# Patient Record
Sex: MALE | Race: WHITE | NOT HISPANIC OR LATINO | Employment: FULL TIME | URBAN - METROPOLITAN AREA
[De-identification: names, ages, dates, MRNs, and addresses within clinical notes are randomized per-mention and may not be internally consistent; named-entity substitution may affect disease eponyms.]

---

## 2023-11-03 PROBLEM — I34.0 MITRAL REGURGITATION: Status: ACTIVE | Noted: 2023-11-03

## 2023-11-03 PROBLEM — M54.2 NECK PAIN: Status: ACTIVE | Noted: 2023-11-03

## 2023-11-03 PROBLEM — R07.89 CHEST PAIN, MIDSTERNAL: Status: ACTIVE | Noted: 2023-11-03

## 2023-11-03 PROBLEM — M25.541 ARTHRALGIA OF BOTH HANDS: Status: ACTIVE | Noted: 2023-11-03

## 2023-11-03 PROBLEM — I34.1 MITRAL VALVE PROLAPSE: Status: ACTIVE | Noted: 2023-11-03

## 2023-11-03 PROBLEM — I25.10 CORONARY ARTERY DISEASE: Status: ACTIVE | Noted: 2023-11-03

## 2023-11-03 PROBLEM — G54.2 CERVICAL NERVE ROOT IMPINGEMENT: Status: ACTIVE | Noted: 2023-11-03

## 2023-11-03 PROBLEM — L98.8 SKIN MACULE: Status: ACTIVE | Noted: 2023-11-03

## 2023-11-03 PROBLEM — I10 HTN (HYPERTENSION): Status: ACTIVE | Noted: 2023-11-03

## 2023-11-03 PROBLEM — E78.5 HYPERLIPIDEMIA: Status: ACTIVE | Noted: 2023-11-03

## 2023-11-03 PROBLEM — M25.542 ARTHRALGIA OF BOTH HANDS: Status: ACTIVE | Noted: 2023-11-03

## 2023-11-03 PROBLEM — R01.1 HEART MURMUR: Status: ACTIVE | Noted: 2023-11-03

## 2023-11-03 PROBLEM — R06.89 ABNORMAL BREATH SOUNDS: Status: ACTIVE | Noted: 2023-11-03

## 2023-11-03 RX ORDER — FLUTICASONE PROPIONATE 50 MCG
SPRAY, SUSPENSION (ML) NASAL
COMMUNITY
Start: 2023-01-09 | End: 2024-02-13 | Stop reason: WASHOUT

## 2023-11-03 RX ORDER — ATORVASTATIN CALCIUM 10 MG/1
1 TABLET, FILM COATED ORAL DAILY
COMMUNITY
Start: 2021-03-18 | End: 2024-02-13 | Stop reason: SDUPTHER

## 2023-11-03 RX ORDER — LOSARTAN POTASSIUM 50 MG/1
1 TABLET ORAL DAILY
COMMUNITY
Start: 2022-03-17 | End: 2024-02-13 | Stop reason: SDUPTHER

## 2023-11-06 ENCOUNTER — OFFICE VISIT (OUTPATIENT)
Dept: PRIMARY CARE | Facility: CLINIC | Age: 53
End: 2023-11-06
Payer: COMMERCIAL

## 2023-11-06 VITALS
TEMPERATURE: 97.7 F | SYSTOLIC BLOOD PRESSURE: 133 MMHG | OXYGEN SATURATION: 98 % | DIASTOLIC BLOOD PRESSURE: 78 MMHG | BODY MASS INDEX: 27.33 KG/M2 | HEART RATE: 68 BPM | HEIGHT: 72 IN | WEIGHT: 201.8 LBS

## 2023-11-06 DIAGNOSIS — G89.29 CHRONIC PAIN OF LEFT WRIST: ICD-10-CM

## 2023-11-06 DIAGNOSIS — E78.5 HYPERLIPIDEMIA, UNSPECIFIED HYPERLIPIDEMIA TYPE: ICD-10-CM

## 2023-11-06 DIAGNOSIS — M25.532 CHRONIC PAIN OF LEFT WRIST: ICD-10-CM

## 2023-11-06 DIAGNOSIS — I10 BENIGN ESSENTIAL HYPERTENSION: ICD-10-CM

## 2023-11-06 DIAGNOSIS — Z00.00 ROUTINE GENERAL MEDICAL EXAMINATION AT A HEALTH CARE FACILITY: Primary | ICD-10-CM

## 2023-11-06 PROCEDURE — 1036F TOBACCO NON-USER: CPT | Performed by: INTERNAL MEDICINE

## 2023-11-06 PROCEDURE — 99396 PREV VISIT EST AGE 40-64: CPT | Performed by: INTERNAL MEDICINE

## 2023-11-06 PROCEDURE — 3078F DIAST BP <80 MM HG: CPT | Performed by: INTERNAL MEDICINE

## 2023-11-06 PROCEDURE — 3075F SYST BP GE 130 - 139MM HG: CPT | Performed by: INTERNAL MEDICINE

## 2023-11-06 ASSESSMENT — PATIENT HEALTH QUESTIONNAIRE - PHQ9
SUM OF ALL RESPONSES TO PHQ9 QUESTIONS 1 AND 2: 0
2. FEELING DOWN, DEPRESSED OR HOPELESS: NOT AT ALL
1. LITTLE INTEREST OR PLEASURE IN DOING THINGS: NOT AT ALL

## 2023-11-06 ASSESSMENT — PAIN SCALES - GENERAL: PAINLEVEL: 0-NO PAIN

## 2023-11-06 NOTE — PROGRESS NOTES
Subjective   Patient ID: Jacobo Nolen is a 53 y.o. male who presents for Annual Exam (Left wrist pain during the summer  ).    HPI     History reviewed and updated.  Will return for fasting labs.  Over summer, developed left wrist pain.  He did not have any acute injury such as a fall.  Pain went across top of hand diagonally and into wrist.  Sometimes with some tenderness of the anterior wrist.  Minimal numbness / tingling.  Has tried using a brace - helps somewhat.  Overall it is better than it was a few months ago, but still has to be careful to avoid triggering movements or pressure.    Review of Systems   Constitutional:  Negative for chills, fatigue and fever.   Respiratory:  Negative for cough, shortness of breath and wheezing.    Cardiovascular:  Negative for chest pain, palpitations and leg swelling.   Gastrointestinal:  Negative for abdominal pain, constipation, diarrhea and nausea.   Genitourinary:  Negative for dysuria and hematuria.   Musculoskeletal:  Positive for arthralgias. Negative for back pain and gait problem.   Neurological:  Negative for dizziness, light-headedness and numbness.   Psychiatric/Behavioral:  Negative for confusion.        Objective   /78   Pulse 68   Temp 36.5 °C (97.7 °F) (Temporal)   Ht 1.829 m (6')   Wt 91.5 kg (201 lb 12.8 oz)   SpO2 98%   BMI 27.37 kg/m²     Physical Exam  Constitutional:       Appearance: Normal appearance.   HENT:      Head: Normocephalic and atraumatic.      Right Ear: Tympanic membrane and ear canal normal.      Left Ear: Tympanic membrane and ear canal normal.   Neck:      Vascular: No carotid bruit.   Cardiovascular:      Rate and Rhythm: Normal rate and regular rhythm.      Pulses: Normal pulses.      Heart sounds: No murmur heard.  Pulmonary:      Effort: Pulmonary effort is normal. No respiratory distress.      Breath sounds: Normal breath sounds. No wheezing.   Abdominal:      General: There is no distension.      Palpations: Abdomen  is soft.      Tenderness: There is no abdominal tenderness.   Musculoskeletal:      Right lower leg: No edema.      Left lower leg: No edema.      Comments: No swelling or deformity of left hand / wrist.  No erythema.  No current point tenderness.   Skin:     Findings: No rash.   Neurological:      General: No focal deficit present.      Mental Status: He is alert and oriented to person, place, and time. Mental status is at baseline.   Psychiatric:         Mood and Affect: Mood normal.         Behavior: Behavior normal.         Thought Content: Thought content normal.         Judgment: Judgment normal.         Assessment/Plan   Problem List Items Addressed This Visit             ICD-10-CM    Benign essential hypertension I10    Hyperlipidemia E78.5    Chronic pain of left wrist M25.532, G89.29     Other Visit Diagnoses         Codes    Routine general medical examination at a health care facility    -  Primary Z00.00    Relevant Orders    CBC and Auto Differential    Comprehensive Metabolic Panel    Lipid Panel    Prostate Specific Antigen          Well Visit    Hypertension - BP is at goal.  Continue current medication and heart healthy habits.    Mixed hyperlipidemia - continue statin.    Return for routine fasting labs.    Left wrist pain - chronic, improved over several months ago.  Location of pain is not strictly anatomical, as it started diagonally over the dorsal hand, wrapping around to the anterior wrist.  There was no reported injury other than repetitive motions at work.  Given that has improved somewhat, I recommend continued rest as able, bracing when needed, and Voltaren gel.  No indication for imaging at this time.  Can follow-up if symptoms worsen.    Prostate cancer screening - PSA ordered.    Colonoscopy 3/8/2021, repeat 3 years.  Dr. James.    Reviewed signs of skin of skin cancer and importance of sun protection.    Continue to stay up to date with dental and eye exams.    Flu shot completed  previously.  Shingrix is recommended.    Follow-up in 6 months and PRN.

## 2023-11-09 PROBLEM — M25.532 CHRONIC PAIN OF LEFT WRIST: Status: ACTIVE | Noted: 2023-11-09

## 2023-11-09 PROBLEM — G89.29 CHRONIC PAIN OF LEFT WRIST: Status: ACTIVE | Noted: 2023-11-09

## 2023-11-09 ASSESSMENT — ENCOUNTER SYMPTOMS
DIARRHEA: 0
HEMATURIA: 0
WHEEZING: 0
FEVER: 0
SHORTNESS OF BREATH: 0
NAUSEA: 0
FATIGUE: 0
ARTHRALGIAS: 1
CONFUSION: 0
PALPITATIONS: 0
ABDOMINAL PAIN: 0
CHILLS: 0
CONSTIPATION: 0
COUGH: 0
LIGHT-HEADEDNESS: 0
BACK PAIN: 0
DIZZINESS: 0
NUMBNESS: 0
DYSURIA: 0

## 2023-11-18 LAB
NON-UH HIE A/G RATIO: 1.3
NON-UH HIE ALB: 3.7 G/DL (ref 3.4–5)
NON-UH HIE ALK PHOS: 76 UNIT/L (ref 45–117)
NON-UH HIE BASO COUNT: 0.08 X1000 (ref 0–0.2)
NON-UH HIE BASOS %: 1.4 %
NON-UH HIE BILIRUBIN, TOTAL: 1.8 MG/DL (ref 0.3–1.2)
NON-UH HIE BUN/CREAT RATIO: 17
NON-UH HIE BUN: 17 MG/DL (ref 9–23)
NON-UH HIE CALCIUM: 9.7 MG/DL (ref 8.7–10.4)
NON-UH HIE CALCULATED LDL CHOLESTEROL: 65 MG/DL (ref 60–130)
NON-UH HIE CALCULATED OSMOLALITY: 289 MOSM/KG (ref 275–295)
NON-UH HIE CHLORIDE: 108 MMOL/L (ref 98–107)
NON-UH HIE CHOLESTEROL: 130 MG/DL (ref 100–200)
NON-UH HIE CO2, VENOUS: 30 MMOL/L (ref 20–31)
NON-UH HIE CREATININE: 1 MG/DL (ref 0.6–1.1)
NON-UH HIE DIFF?: NO
NON-UH HIE EOS COUNT: 0.23 X1000 (ref 0–0.5)
NON-UH HIE EOSIN %: 3.8 %
NON-UH HIE GFR AA: >60
NON-UH HIE GLOBULIN: 2.8 G/DL
NON-UH HIE GLOMERULAR FILTRATION RATE: >60 ML/MIN/1.73M?
NON-UH HIE GLUCOSE: 110 MG/DL (ref 74–106)
NON-UH HIE GOT: 23 UNIT/L (ref 15–37)
NON-UH HIE GPT: 39 UNIT/L (ref 10–49)
NON-UH HIE HCT: 43.4 % (ref 41–52)
NON-UH HIE HDL CHOLESTEROL: 49 MG/DL (ref 40–60)
NON-UH HIE HGB: 14.8 G/DL (ref 13.5–17.5)
NON-UH HIE INSTR WBC: 6.1
NON-UH HIE K: 4.3 MMOL/L (ref 3.5–5.1)
NON-UH HIE LYMPH %: 30.9 %
NON-UH HIE LYMPH COUNT: 1.88 X1000 (ref 1.2–4.8)
NON-UH HIE MCH: 33.2 PG (ref 27–34)
NON-UH HIE MCHC: 34.2 G/DL (ref 32–37)
NON-UH HIE MCV: 97.2 FL (ref 80–100)
NON-UH HIE MONO %: 8.2 %
NON-UH HIE MONO COUNT: 0.5 X1000 (ref 0.1–1)
NON-UH HIE MPV: 8.5 FL (ref 7.4–10.4)
NON-UH HIE NA: 144 MMOL/L (ref 135–145)
NON-UH HIE NEUTROPHIL %: 55.6 %
NON-UH HIE NEUTROPHIL COUNT (ANC): 3.38 X1000 (ref 1.4–8.8)
NON-UH HIE NUCLEATED RBC: 0 /100WBC
NON-UH HIE PLATELET: 239 X10 (ref 150–450)
NON-UH HIE PROSTATIC SPECIFIC ANTIGEN: 0.4 NG/ML (ref 0–4)
NON-UH HIE RBC: 4.47 X10 (ref 4.7–6.1)
NON-UH HIE RDW: 12.9 % (ref 11.5–14.5)
NON-UH HIE TOTAL CHOL/HDL CHOL RATIO: 2.7
NON-UH HIE TOTAL PROTEIN: 6.5 G/DL (ref 5.7–8.2)
NON-UH HIE TRIGLYCERIDES: 81 MG/DL (ref 30–150)
NON-UH HIE WBC: 6.1 X10 (ref 4.5–11)

## 2023-11-22 PROBLEM — R73.01 IMPAIRED FASTING BLOOD SUGAR: Status: ACTIVE | Noted: 2023-11-22

## 2024-02-12 ENCOUNTER — AMBULATORY SURGICAL CENTER (OUTPATIENT)
Dept: URBAN - METROPOLITAN AREA SURGERY 12 | Facility: SURGERY | Age: 54
End: 2024-02-12
Payer: COMMERCIAL

## 2024-02-12 ENCOUNTER — OFFICE (OUTPATIENT)
Dept: URBAN - METROPOLITAN AREA PATHOLOGY 2 | Facility: PATHOLOGY | Age: 54
End: 2024-02-12
Payer: COMMERCIAL

## 2024-02-12 VITALS
HEART RATE: 60 BPM | HEART RATE: 77 BPM | SYSTOLIC BLOOD PRESSURE: 103 MMHG | OXYGEN SATURATION: 98 % | SYSTOLIC BLOOD PRESSURE: 98 MMHG | DIASTOLIC BLOOD PRESSURE: 55 MMHG | HEART RATE: 60 BPM | SYSTOLIC BLOOD PRESSURE: 140 MMHG | RESPIRATION RATE: 20 BRPM | SYSTOLIC BLOOD PRESSURE: 113 MMHG | SYSTOLIC BLOOD PRESSURE: 113 MMHG | HEART RATE: 77 BPM | RESPIRATION RATE: 12 BRPM | HEART RATE: 73 BPM | HEART RATE: 63 BPM | RESPIRATION RATE: 21 BRPM | WEIGHT: 200 LBS | SYSTOLIC BLOOD PRESSURE: 102 MMHG | SYSTOLIC BLOOD PRESSURE: 117 MMHG | SYSTOLIC BLOOD PRESSURE: 102 MMHG | HEART RATE: 67 BPM | HEART RATE: 63 BPM | SYSTOLIC BLOOD PRESSURE: 98 MMHG | HEART RATE: 71 BPM | DIASTOLIC BLOOD PRESSURE: 51 MMHG | HEART RATE: 55 BPM | HEIGHT: 72 IN | HEART RATE: 72 BPM | OXYGEN SATURATION: 99 % | WEIGHT: 200 LBS | SYSTOLIC BLOOD PRESSURE: 130 MMHG | OXYGEN SATURATION: 96 % | RESPIRATION RATE: 23 BRPM | HEART RATE: 65 BPM | HEART RATE: 67 BPM | RESPIRATION RATE: 22 BRPM | HEART RATE: 72 BPM | SYSTOLIC BLOOD PRESSURE: 140 MMHG | HEART RATE: 73 BPM | DIASTOLIC BLOOD PRESSURE: 81 MMHG | HEART RATE: 63 BPM | DIASTOLIC BLOOD PRESSURE: 87 MMHG | OXYGEN SATURATION: 96 % | HEIGHT: 72 IN | RESPIRATION RATE: 12 BRPM | RESPIRATION RATE: 18 BRPM | RESPIRATION RATE: 18 BRPM | TEMPERATURE: 97.7 F | DIASTOLIC BLOOD PRESSURE: 53 MMHG | RESPIRATION RATE: 16 BRPM | SYSTOLIC BLOOD PRESSURE: 98 MMHG | RESPIRATION RATE: 20 BRPM | RESPIRATION RATE: 22 BRPM | HEART RATE: 55 BPM | OXYGEN SATURATION: 99 % | DIASTOLIC BLOOD PRESSURE: 61 MMHG | TEMPERATURE: 97.7 F | SYSTOLIC BLOOD PRESSURE: 140 MMHG | HEART RATE: 72 BPM | RESPIRATION RATE: 23 BRPM | SYSTOLIC BLOOD PRESSURE: 117 MMHG | DIASTOLIC BLOOD PRESSURE: 61 MMHG | SYSTOLIC BLOOD PRESSURE: 130 MMHG | HEART RATE: 60 BPM | HEIGHT: 72 IN | RESPIRATION RATE: 12 BRPM | HEART RATE: 71 BPM | RESPIRATION RATE: 22 BRPM | DIASTOLIC BLOOD PRESSURE: 59 MMHG | HEART RATE: 65 BPM | DIASTOLIC BLOOD PRESSURE: 59 MMHG | TEMPERATURE: 97.7 F | HEART RATE: 77 BPM | OXYGEN SATURATION: 99 % | RESPIRATION RATE: 18 BRPM | SYSTOLIC BLOOD PRESSURE: 103 MMHG | DIASTOLIC BLOOD PRESSURE: 55 MMHG | DIASTOLIC BLOOD PRESSURE: 53 MMHG | OXYGEN SATURATION: 98 % | DIASTOLIC BLOOD PRESSURE: 81 MMHG | SYSTOLIC BLOOD PRESSURE: 95 MMHG | HEART RATE: 73 BPM | RESPIRATION RATE: 13 BRPM | DIASTOLIC BLOOD PRESSURE: 87 MMHG | OXYGEN SATURATION: 94 % | SYSTOLIC BLOOD PRESSURE: 102 MMHG | OXYGEN SATURATION: 94 % | HEART RATE: 55 BPM | DIASTOLIC BLOOD PRESSURE: 51 MMHG | SYSTOLIC BLOOD PRESSURE: 90 MMHG | RESPIRATION RATE: 16 BRPM | RESPIRATION RATE: 21 BRPM | RESPIRATION RATE: 16 BRPM | SYSTOLIC BLOOD PRESSURE: 117 MMHG | RESPIRATION RATE: 23 BRPM | WEIGHT: 200 LBS | OXYGEN SATURATION: 96 % | DIASTOLIC BLOOD PRESSURE: 55 MMHG | SYSTOLIC BLOOD PRESSURE: 90 MMHG | DIASTOLIC BLOOD PRESSURE: 61 MMHG | SYSTOLIC BLOOD PRESSURE: 113 MMHG | SYSTOLIC BLOOD PRESSURE: 130 MMHG | OXYGEN SATURATION: 98 % | RESPIRATION RATE: 21 BRPM | DIASTOLIC BLOOD PRESSURE: 59 MMHG | RESPIRATION RATE: 13 BRPM | HEART RATE: 67 BPM | RESPIRATION RATE: 20 BRPM | HEART RATE: 71 BPM | SYSTOLIC BLOOD PRESSURE: 95 MMHG | DIASTOLIC BLOOD PRESSURE: 81 MMHG | RESPIRATION RATE: 13 BRPM | OXYGEN SATURATION: 94 % | SYSTOLIC BLOOD PRESSURE: 95 MMHG | SYSTOLIC BLOOD PRESSURE: 103 MMHG | DIASTOLIC BLOOD PRESSURE: 87 MMHG | SYSTOLIC BLOOD PRESSURE: 90 MMHG | DIASTOLIC BLOOD PRESSURE: 51 MMHG | HEART RATE: 65 BPM | DIASTOLIC BLOOD PRESSURE: 53 MMHG

## 2024-02-12 DIAGNOSIS — K63.5 POLYP OF COLON: ICD-10-CM

## 2024-02-12 DIAGNOSIS — K64.8 OTHER HEMORRHOIDS: ICD-10-CM

## 2024-02-12 DIAGNOSIS — Z86.010 PERSONAL HISTORY OF COLONIC POLYPS: ICD-10-CM

## 2024-02-12 DIAGNOSIS — Z09 ENCOUNTER FOR FOLLOW-UP EXAMINATION AFTER COMPLETED TREATMEN: ICD-10-CM

## 2024-02-12 PROCEDURE — 45384 COLONOSCOPY W/LESION REMOVAL: CPT | Performed by: INTERNAL MEDICINE

## 2024-02-12 PROCEDURE — 88305 TISSUE EXAM BY PATHOLOGIST: CPT | Performed by: PATHOLOGY

## 2024-02-13 ENCOUNTER — OFFICE VISIT (OUTPATIENT)
Dept: CARDIOLOGY | Facility: CLINIC | Age: 54
End: 2024-02-13
Payer: COMMERCIAL

## 2024-02-13 VITALS
BODY MASS INDEX: 27.09 KG/M2 | DIASTOLIC BLOOD PRESSURE: 76 MMHG | OXYGEN SATURATION: 95 % | HEART RATE: 84 BPM | HEIGHT: 72 IN | SYSTOLIC BLOOD PRESSURE: 130 MMHG | WEIGHT: 200 LBS

## 2024-02-13 DIAGNOSIS — I10 BENIGN ESSENTIAL HYPERTENSION: Primary | ICD-10-CM

## 2024-02-13 DIAGNOSIS — E78.49 OTHER HYPERLIPIDEMIA: ICD-10-CM

## 2024-02-13 DIAGNOSIS — I34.0 NONRHEUMATIC MITRAL VALVE REGURGITATION: ICD-10-CM

## 2024-02-13 DIAGNOSIS — I25.10 CORONARY ARTERY DISEASE INVOLVING NATIVE CORONARY ARTERY OF NATIVE HEART WITHOUT ANGINA PECTORIS: ICD-10-CM

## 2024-02-13 DIAGNOSIS — I34.1 MITRAL VALVE PROLAPSE: ICD-10-CM

## 2024-02-13 PROBLEM — R07.89 CHEST PAIN, MIDSTERNAL: Status: RESOLVED | Noted: 2023-11-03 | Resolved: 2024-02-13

## 2024-02-13 PROBLEM — R01.1 HEART MURMUR: Status: RESOLVED | Noted: 2023-11-03 | Resolved: 2024-02-13

## 2024-02-13 PROCEDURE — 3078F DIAST BP <80 MM HG: CPT | Performed by: INTERNAL MEDICINE

## 2024-02-13 PROCEDURE — 1036F TOBACCO NON-USER: CPT | Performed by: INTERNAL MEDICINE

## 2024-02-13 PROCEDURE — 3075F SYST BP GE 130 - 139MM HG: CPT | Performed by: INTERNAL MEDICINE

## 2024-02-13 PROCEDURE — 99213 OFFICE O/P EST LOW 20 MIN: CPT | Performed by: INTERNAL MEDICINE

## 2024-02-13 RX ORDER — LOSARTAN POTASSIUM 50 MG/1
50 TABLET ORAL DAILY
Qty: 90 TABLET | Refills: 3 | Status: SHIPPED | OUTPATIENT
Start: 2024-02-13 | End: 2025-02-12

## 2024-02-13 RX ORDER — ATORVASTATIN CALCIUM 10 MG/1
10 TABLET, FILM COATED ORAL DAILY
Qty: 90 TABLET | Refills: 3 | Status: SHIPPED | OUTPATIENT
Start: 2024-02-13 | End: 2025-02-12

## 2024-02-13 NOTE — PATIENT INSTRUCTIONS
No changes in your medications.    See an ENT physician Dr. Mitchell Myers [phone] at [facility] or Dr. Julien Cordova [phone] at [facility].

## 2024-02-13 NOTE — PROGRESS NOTES
Subjective   Dakota Nolen is a 53 y.o. male.    Chief Complaint:  Follow-up coronary artery disease.    HPI    From a cardiac standpoint he has done well.  He has remained asymptomatic over the past year.  No other symptoms such as shortness of breath or exertional dyspnea.  He does complain of dysphagia.  It is in the throat area.  He has had it for the past month.    The diagnosis of coronary artery disease is based on a positive calcium score 53 consistent with the presence of mild coronary atherosclerosis. This study was done in February 2021.     His past cardiac history is otherwise unremarkable. There is no history hypertension. There is no history of diabetes. There is a positive family history of heart disease in that his father had coronary artery bypass grafting in his mid 70s. At the time he also had an aortic valve done. There is a history of hyperlipidemia.     He's had no major surgical procedures or major medical problems except for arthritis issues. He does take glucosamine chondroitin.     Allergies  Medication    · No Known Drug Allergies   Recorded By: Christiane Choudhary; 3/5/2014 4:24:46 PM     Family History  Mother    · Family history of diabetes mellitus (V18.0) (Z83.3)   · Family history of hypertension (V17.49) (Z82.49)   · Family history of macular degeneration (V19.19) (Z83.518)   · Family history of polymyalgia rheumatica (V17.89) (Z82.69)  Father    · Family history of CABG   · Family history of cerebrovascular accident (CVA) (V17.1) (Z82.3)   · Family history of coronary artery disease (V17.3) (Z82.49)   · Family history of diabetes mellitus (V18.0) (Z83.3)   · Family history of glaucoma (V19.11) (Z83.511)   · Family history of hypercholesterolemia (V18.19) (Z83.42)   · Family history of hypertension (V17.49) (Z82.49)     Social History  Problems    ·    · Does not use illicit drugs (V49.89) (Z78.9)   · Never a smoker   · Occupation   ·    · Social alcohol  use    Review of Systems   Gastrointestinal:  Positive for dysphagia.   All other systems reviewed and are negative.      Visit Vitals  /76 (BP Location: Left arm, Patient Position: Sitting)   Pulse 84   Ht 1.829 m (6')   Wt 90.7 kg (200 lb)   SpO2 95%   BMI 27.12 kg/m²   Smoking Status Former   BSA 2.15 m²        Objective     Constitutional:       Appearance: Not in distress.   Neck:      Vascular: JVD normal.   Pulmonary:      Breath sounds: Normal breath sounds.   Cardiovascular:      Normal rate. Regular rhythm. S1 with normal intensity. S2 with normal intensity.       Murmurs: There is no murmur.      No gallop.    Pulses:     Intact distal pulses.   Edema:     Peripheral edema absent.   Abdominal:      General: Bowel sounds are normal.   Neurological:      Mental Status: Alert and oriented to person, place and time.         Lab Review:   Lab Results   Component Value Date     02/01/2018    K 3.9 02/01/2018     02/01/2018    CO2 32 02/01/2018    BUN 14 02/01/2018    CREATININE 0.83 02/01/2018    GLUCOSE 91 02/01/2018    CALCIUM 10.1 02/01/2018       Assessment:    1.  Coronary disease.  Remains asymptomatic.  Mild by CT calcium scoring but this is a very high score for his age.    2.  Hyperlipidemia.  Cholesterol 130, HDL 49, LDL 65.    3.  Hypertension.  Blood pressures are normal.    4.  Dysphagia.  Refer to ENT since his dysphagia appears to be in the throat area.

## 2024-11-11 ENCOUNTER — APPOINTMENT (OUTPATIENT)
Dept: PRIMARY CARE | Facility: CLINIC | Age: 54
End: 2024-11-11
Payer: COMMERCIAL

## 2024-11-11 VITALS
OXYGEN SATURATION: 96 % | WEIGHT: 199.9 LBS | DIASTOLIC BLOOD PRESSURE: 81 MMHG | BODY MASS INDEX: 27.07 KG/M2 | SYSTOLIC BLOOD PRESSURE: 118 MMHG | TEMPERATURE: 97.2 F | HEART RATE: 77 BPM | HEIGHT: 72 IN

## 2024-11-11 DIAGNOSIS — I10 BENIGN ESSENTIAL HYPERTENSION: ICD-10-CM

## 2024-11-11 DIAGNOSIS — E78.49 OTHER HYPERLIPIDEMIA: ICD-10-CM

## 2024-11-11 DIAGNOSIS — Z00.00 HEALTHCARE MAINTENANCE: Primary | ICD-10-CM

## 2024-11-11 PROCEDURE — 1036F TOBACCO NON-USER: CPT | Performed by: INTERNAL MEDICINE

## 2024-11-11 PROCEDURE — 3074F SYST BP LT 130 MM HG: CPT | Performed by: INTERNAL MEDICINE

## 2024-11-11 PROCEDURE — 99396 PREV VISIT EST AGE 40-64: CPT | Performed by: INTERNAL MEDICINE

## 2024-11-11 PROCEDURE — 3008F BODY MASS INDEX DOCD: CPT | Performed by: INTERNAL MEDICINE

## 2024-11-11 PROCEDURE — 3079F DIAST BP 80-89 MM HG: CPT | Performed by: INTERNAL MEDICINE

## 2024-11-11 ASSESSMENT — PATIENT HEALTH QUESTIONNAIRE - PHQ9
1. LITTLE INTEREST OR PLEASURE IN DOING THINGS: NOT AT ALL
2. FEELING DOWN, DEPRESSED OR HOPELESS: NOT AT ALL
SUM OF ALL RESPONSES TO PHQ9 QUESTIONS 1 AND 2: 0

## 2024-11-11 NOTE — PROGRESS NOTES
CHIEF COMPLAINT  Annual Exam    HISTORY OF PRESENT ILLNESS  Dakota Nolen is a 54 y.o. male presents today for follow up of Annual Exam    HPI    Past Medical, Surgical, and Family History reviewed and updated in chart.  Reviewed all medications by prescribing practitioner or clinical pharmacist (such as prescriptions, OTCs, herbal therapies and supplements) and documented in the medical record.    Stable on current  medications.  Due for fasting labs.   Plans to see dermatologist for spot on face and scalp.      REVIEW OF SYSTEMS  Review of Systems   Constitutional:  Negative for chills, fatigue and fever.   Respiratory:  Negative for cough, shortness of breath and wheezing.    Cardiovascular:  Negative for chest pain, palpitations and leg swelling.   Gastrointestinal:  Negative for abdominal pain, constipation, diarrhea and nausea.   Genitourinary:  Negative for dysuria and hematuria.   Musculoskeletal:  Negative for arthralgias, back pain and gait problem.   Skin:  Negative for rash.   Neurological:  Negative for dizziness, light-headedness and headaches.   Psychiatric/Behavioral:  Negative for dysphoric mood. The patient is not nervous/anxious.        ALLERGIES  Patient has no known allergies.    MEDICATIONS  Current Outpatient Medications   Medication Instructions    atorvastatin (LIPITOR) 10 mg, oral, Daily    ergocalciferol, vitamin D2, (VITAMIN D2 ORAL) Take by mouth.    glucosamine sulfate (GLUCOSAMINE ORAL) Take by mouth.    losartan (COZAAR) 50 mg, oral, Daily       TOBACCO USE  Social History     Tobacco Use   Smoking Status Former    Types: Cigarettes   Smokeless Tobacco Never       DEPRESSION SCREEN  Over the past 2 weeks, how often have you been bothered by any of the following problems?  Little interest or pleasure in doing things: Not at all  Feeling down, depressed, or hopeless: Not at all    SURGICAL HISTORY  Past Surgical History:  03/05/2018: COLONOSCOPY      Comment:  Complete  Colonoscopy  03/08/2021: OTHER SURGICAL HISTORY      Comment:  Complete colonoscopy       OBJECTIVE    /81   Pulse 77   Temp 36.2 °C (97.2 °F)   Ht 1.829 m (6')   Wt 90.7 kg (199 lb 14.4 oz)   SpO2 96%   BMI 27.11 kg/m²    BMI: Estimated body mass index is 27.11 kg/m² as calculated from the following:    Height as of this encounter: 1.829 m (6').    Weight as of this encounter: 90.7 kg (199 lb 14.4 oz).    BP Readings from Last 3 Encounters:   11/11/24 118/81   02/13/24 130/76   11/06/23 133/78      Wt Readings from Last 3 Encounters:   11/11/24 90.7 kg (199 lb 14.4 oz)   02/13/24 90.7 kg (200 lb)   11/06/23 91.5 kg (201 lb 12.8 oz)        PHYSICAL EXAM  Physical Exam  Constitutional:       Appearance: Normal appearance.   HENT:      Head: Normocephalic and atraumatic.      Right Ear: Tympanic membrane and ear canal normal.      Left Ear: Tympanic membrane and ear canal normal.   Neck:      Vascular: No carotid bruit.   Cardiovascular:      Rate and Rhythm: Normal rate and regular rhythm.      Heart sounds: No murmur heard.  Pulmonary:      Effort: Pulmonary effort is normal. No respiratory distress.      Breath sounds: Normal breath sounds. No wheezing.   Abdominal:      General: There is no distension.      Palpations: Abdomen is soft.      Tenderness: There is no abdominal tenderness.   Musculoskeletal:      Right lower leg: No edema.      Left lower leg: No edema.   Skin:     Findings: No rash.   Neurological:      General: No focal deficit present.      Mental Status: He is alert and oriented to person, place, and time. Mental status is at baseline.      Gait: Gait normal.   Psychiatric:         Mood and Affect: Mood normal.         Behavior: Behavior normal.         Thought Content: Thought content normal.         Judgment: Judgment normal.          ASSESSMENT AND PLAN  Assessment/Plan   Problem List Items Addressed This Visit       Benign essential hypertension    Hyperlipidemia    Healthcare  maintenance - Primary    Relevant Orders    Lipid Panel    Comprehensive Metabolic Panel    CBC and Auto Differential    Prostate Specific Antigen       Well Visit     Hypertension - BP is at goal.  Continue current medication and heart healthy habits.     Mixed hyperlipidemia - continue statin.    Aim for 30 minutes of aerobic exercise, 5 times per week.  Try to cut back on processed foods, including foods made with flour, sugar, added salt, and saturated fat.      Routine fasting labs - CBC, CMP, FLP.      Prostate cancer screening - PSA 11/18/23, ordered for 2024.     Colonoscopy 3/8/2021, repeat 3 years.  Advised to follow-up with Dr. James.     Reviewed signs of skin of skin cancer and importance of sun protection.  Plans to schedule with dermatologist.      Continue to stay up to date with dental and eye exams.     Flu shot completed previously.  Shingrix is recommended.     Follow-up annually, sooner pending results.

## 2024-11-13 PROBLEM — R06.89 ABNORMAL BREATH SOUNDS: Status: RESOLVED | Noted: 2023-11-03 | Resolved: 2024-11-13

## 2024-11-13 PROBLEM — Z00.00 HEALTHCARE MAINTENANCE: Status: ACTIVE | Noted: 2024-11-13

## 2024-11-13 ASSESSMENT — ENCOUNTER SYMPTOMS
DIARRHEA: 0
COUGH: 0
DYSURIA: 0
FATIGUE: 0
SHORTNESS OF BREATH: 0
ARTHRALGIAS: 0
ABDOMINAL PAIN: 0
HEMATURIA: 0
HEADACHES: 0
BACK PAIN: 0
FEVER: 0
CHILLS: 0
WHEEZING: 0
CONSTIPATION: 0
DYSPHORIC MOOD: 0
PALPITATIONS: 0
NERVOUS/ANXIOUS: 0
DIZZINESS: 0
NAUSEA: 0
LIGHT-HEADEDNESS: 0

## 2024-11-14 PROBLEM — K21.9 CHRONIC GERD: Status: ACTIVE | Noted: 2024-05-16

## 2024-11-15 DIAGNOSIS — R17 ELEVATED BILIRUBIN: Primary | ICD-10-CM

## 2024-11-15 LAB
NON-UH HIE A/G RATIO: 1.4
NON-UH HIE ALB: 4 G/DL (ref 3.4–5)
NON-UH HIE ALK PHOS: 78 UNIT/L (ref 45–117)
NON-UH HIE BASO COUNT: 0.06 X1000 (ref 0–0.2)
NON-UH HIE BASOS %: 0.8 %
NON-UH HIE BILIRUBIN, TOTAL: 2.5 MG/DL (ref 0.3–1.2)
NON-UH HIE BUN/CREAT RATIO: 18
NON-UH HIE BUN: 18 MG/DL (ref 9–23)
NON-UH HIE CALCIUM: 9.9 MG/DL (ref 8.7–10.4)
NON-UH HIE CALCULATED LDL CHOLESTEROL: 70 MG/DL (ref 60–130)
NON-UH HIE CALCULATED OSMOLALITY: 287 MOSM/KG (ref 275–295)
NON-UH HIE CHLORIDE: 106 MMOL/L (ref 98–107)
NON-UH HIE CHOLESTEROL: 136 MG/DL (ref 100–200)
NON-UH HIE CO2, VENOUS: 29 MMOL/L (ref 20–31)
NON-UH HIE CREATININE: 1 MG/DL (ref 0.6–1.1)
NON-UH HIE DIFF?: NO
NON-UH HIE EOS COUNT: 0.23 X1000 (ref 0–0.5)
NON-UH HIE EOSIN %: 3 %
NON-UH HIE GFR AA: >60
NON-UH HIE GLOBULIN: 2.8 G/DL
NON-UH HIE GLOMERULAR FILTRATION RATE: >60 ML/MIN/1.73M?
NON-UH HIE GLUCOSE: 103 MG/DL (ref 74–106)
NON-UH HIE GOT: 19 UNIT/L (ref 15–37)
NON-UH HIE GPT: 24 UNIT/L (ref 10–49)
NON-UH HIE HCT: 44.3 % (ref 41–52)
NON-UH HIE HDL CHOLESTEROL: 49 MG/DL (ref 40–60)
NON-UH HIE HGB: 15.1 G/DL (ref 13.5–17.5)
NON-UH HIE INSTR WBC: 7.7
NON-UH HIE K: 4.6 MMOL/L (ref 3.5–5.1)
NON-UH HIE LYMPH %: 26 %
NON-UH HIE LYMPH COUNT: 2 X1000 (ref 1.2–4.8)
NON-UH HIE MCH: 33.2 PG (ref 27–34)
NON-UH HIE MCHC: 34 G/DL (ref 32–37)
NON-UH HIE MCV: 97.7 FL (ref 80–100)
NON-UH HIE MONO %: 7.1 %
NON-UH HIE MONO COUNT: 0.55 X1000 (ref 0.1–1)
NON-UH HIE MPV: 8.7 FL (ref 7.4–10.4)
NON-UH HIE NA: 143 MMOL/L (ref 135–145)
NON-UH HIE NEUTROPHIL %: 63 %
NON-UH HIE NEUTROPHIL COUNT (ANC): 4.84 X1000 (ref 1.4–8.8)
NON-UH HIE NUCLEATED RBC: 0 /100WBC
NON-UH HIE PLATELET: 234 X10 (ref 150–450)
NON-UH HIE PROSTATIC SPECIFIC ANTIGEN: 0.5 NG/ML (ref 0–4)
NON-UH HIE RBC: 4.54 X10 (ref 4.7–6.1)
NON-UH HIE RDW: 12.8 % (ref 11.5–14.5)
NON-UH HIE TOTAL CHOL/HDL CHOL RATIO: 2.8
NON-UH HIE TOTAL PROTEIN: 6.8 G/DL (ref 5.7–8.2)
NON-UH HIE TRIGLYCERIDES: 87 MG/DL (ref 30–150)
NON-UH HIE WBC: 7.7 X10 (ref 4.5–11)

## 2024-12-04 ENCOUNTER — TELEPHONE (OUTPATIENT)
Dept: PRIMARY CARE | Facility: CLINIC | Age: 54
End: 2024-12-04
Payer: COMMERCIAL

## 2024-12-04 DIAGNOSIS — R17 ELEVATED BILIRUBIN: Primary | ICD-10-CM

## 2024-12-10 ENCOUNTER — APPOINTMENT (OUTPATIENT)
Dept: CARDIOLOGY | Facility: CLINIC | Age: 54
End: 2024-12-10
Payer: COMMERCIAL

## 2024-12-10 ENCOUNTER — HOSPITAL ENCOUNTER (OUTPATIENT)
Dept: CARDIOLOGY | Facility: CLINIC | Age: 54
Discharge: HOME | End: 2024-12-10
Payer: COMMERCIAL

## 2024-12-10 VITALS
DIASTOLIC BLOOD PRESSURE: 70 MMHG | HEART RATE: 79 BPM | OXYGEN SATURATION: 95 % | BODY MASS INDEX: 27.5 KG/M2 | SYSTOLIC BLOOD PRESSURE: 116 MMHG | WEIGHT: 203 LBS | HEIGHT: 72 IN

## 2024-12-10 VITALS
SYSTOLIC BLOOD PRESSURE: 118 MMHG | WEIGHT: 199 LBS | HEIGHT: 72 IN | DIASTOLIC BLOOD PRESSURE: 81 MMHG | BODY MASS INDEX: 26.95 KG/M2

## 2024-12-10 DIAGNOSIS — I10 BENIGN ESSENTIAL HYPERTENSION: ICD-10-CM

## 2024-12-10 DIAGNOSIS — I34.0 NONRHEUMATIC MITRAL VALVE REGURGITATION: ICD-10-CM

## 2024-12-10 DIAGNOSIS — I25.10 CORONARY ARTERY DISEASE INVOLVING NATIVE CORONARY ARTERY OF NATIVE HEART WITHOUT ANGINA PECTORIS: ICD-10-CM

## 2024-12-10 DIAGNOSIS — I05.1 RHEUMATIC MITRAL INSUFFICIENCY: ICD-10-CM

## 2024-12-10 DIAGNOSIS — I34.1 MITRAL VALVE PROLAPSE: ICD-10-CM

## 2024-12-10 DIAGNOSIS — I10 BENIGN ESSENTIAL HYPERTENSION: Primary | ICD-10-CM

## 2024-12-10 DIAGNOSIS — I34.0 MODERATE MITRAL REGURGITATION: ICD-10-CM

## 2024-12-10 DIAGNOSIS — E78.49 OTHER HYPERLIPIDEMIA: ICD-10-CM

## 2024-12-10 LAB
ATRIAL RATE: 76 BPM
P AXIS: 48 DEGREES
P OFFSET: 197 MS
P ONSET: 142 MS
PR INTERVAL: 164 MS
Q ONSET: 224 MS
QRS COUNT: 12 BEATS
QRS DURATION: 94 MS
QT INTERVAL: 372 MS
QTC CALCULATION(BAZETT): 418 MS
QTC FREDERICIA: 402 MS
R AXIS: 0 DEGREES
T AXIS: 52 DEGREES
T OFFSET: 410 MS
VENTRICULAR RATE: 76 BPM

## 2024-12-10 PROCEDURE — 99213 OFFICE O/P EST LOW 20 MIN: CPT | Performed by: INTERNAL MEDICINE

## 2024-12-10 PROCEDURE — 93306 TTE W/DOPPLER COMPLETE: CPT

## 2024-12-10 PROCEDURE — 3074F SYST BP LT 130 MM HG: CPT | Performed by: INTERNAL MEDICINE

## 2024-12-10 PROCEDURE — 3008F BODY MASS INDEX DOCD: CPT | Performed by: INTERNAL MEDICINE

## 2024-12-10 PROCEDURE — 1036F TOBACCO NON-USER: CPT | Performed by: INTERNAL MEDICINE

## 2024-12-10 PROCEDURE — 93005 ELECTROCARDIOGRAM TRACING: CPT | Performed by: INTERNAL MEDICINE

## 2024-12-10 PROCEDURE — 93306 TTE W/DOPPLER COMPLETE: CPT | Performed by: INTERNAL MEDICINE

## 2024-12-10 PROCEDURE — 3078F DIAST BP <80 MM HG: CPT | Performed by: INTERNAL MEDICINE

## 2024-12-10 RX ORDER — PANTOPRAZOLE SODIUM 40 MG/1
1 TABLET, DELAYED RELEASE ORAL
COMMUNITY
Start: 2024-07-20 | End: 2024-12-10 | Stop reason: ALTCHOICE

## 2024-12-10 RX ORDER — LOSARTAN POTASSIUM 50 MG/1
50 TABLET ORAL DAILY
Qty: 90 TABLET | Refills: 3 | Status: SHIPPED | OUTPATIENT
Start: 2024-12-10 | End: 2025-12-10

## 2024-12-10 RX ORDER — ATORVASTATIN CALCIUM 10 MG/1
10 TABLET, FILM COATED ORAL DAILY
Qty: 90 TABLET | Refills: 3 | Status: SHIPPED | OUTPATIENT
Start: 2024-12-10 | End: 2025-12-10

## 2024-12-10 ASSESSMENT — ENCOUNTER SYMPTOMS: COUGH: 1

## 2024-12-10 NOTE — PATIENT INSTRUCTIONS
Your echocardiogram shows normal heart function.  The mitral valve problem shows no worsening from the study in 2021.

## 2024-12-10 NOTE — PROGRESS NOTES
Subjective   Dakota Nolen is a 54 y.o. male.    Chief Complaint:  Follow-up coronary artery disease.    HPI    Has had complaints of dysphagia.  Seen by ENT service.  [place] to have gastroesophageal reflux.  Was on Protonix for about 3 months with significant improvement.  Now he is having some recurrent symptoms.  No anginal symptoms.  Otherwise feels well.    The diagnosis of coronary artery disease is based on a positive calcium score 53 consistent with the presence of mild coronary atherosclerosis. This study was done in February 2021.     His past cardiac history is otherwise unremarkable. There is no history hypertension. There is no history of diabetes. There is a positive family history of heart disease in that his father had coronary artery bypass grafting in his mid 70s. At the time he also had an aortic valve done. There is a history of hyperlipidemia.     He's had no major surgical procedures or major medical problems except for arthritis issues. He does take glucosamine chondroitin.      Allergies  Medication    · No Known Drug Allergies     Family History  Mother    · Family history of diabetes mellitus (V18.0) (Z83.3)   · Family history of hypertension (V17.49) (Z82.49)  Father    · Family history of CABG   · Family history of cerebrovascular accident (CVA) (V17.1) (Z82.3)     Social History  Problems    ·    · Never a smoker    · Social alcohol use    Review of Systems   Respiratory:  Positive for cough.    Musculoskeletal:  Positive for arthritis.   All other systems reviewed and are negative.      Current Outpatient Medications   Medication Sig Dispense Refill    ergocalciferol, vitamin D2, (VITAMIN D2 ORAL) Take by mouth.      glucosamine sulfate (GLUCOSAMINE ORAL) Take by mouth.      atorvastatin (Lipitor) 10 mg tablet Take 1 tablet (10 mg) by mouth once daily. 90 tablet 3    losartan (Cozaar) 50 mg tablet Take 1 tablet (50 mg) by mouth once daily. 90 tablet 3     No current  facility-administered medications for this visit.        Visit Vitals  /70 (BP Location: Right arm)   Pulse 79   Ht 1.829 m (6')   Wt 92.1 kg (203 lb)   SpO2 95%   BMI 27.53 kg/m²   Smoking Status Former   BSA 2.16 m²        Objective     Constitutional:       Appearance: Not in distress.   Neck:      Vascular: JVD normal.   Pulmonary:      Breath sounds: Normal breath sounds.   Cardiovascular:      Normal rate. Regular rhythm. S1 with normal intensity. S2 with normal intensity.       Murmurs: There is no murmur.      No gallop.    Pulses:     Intact distal pulses.   Edema:     Peripheral edema absent.   Abdominal:      General: Bowel sounds are normal.   Neurological:      Mental Status: Alert and oriented to person, place and time.         Lab Review:   Lab Results   Component Value Date     02/01/2018    K 3.9 02/01/2018     02/01/2018    CO2 32 02/01/2018    BUN 14 02/01/2018    CREATININE 0.83 02/01/2018    GLUCOSE 91 02/01/2018    CALCIUM 10.1 02/01/2018     Lab Results   Component Value Date    CHOL 187 02/01/2018    TRIG 132 02/01/2018    HDL 43.1 02/01/2018       Assessment:    1.  Coronary artery disease.  No typical anginal symptoms.  High score for his age.  Continue medical management.  Today's echo shows normal left ventricular function.    2.  Hyperlipidemia.  Cholesterol 136, HDL 49, LDL 70.    3.  Hypertension.  Blood pressures are excellent.    4.  Mitral regurgitation.  Mitral valve prolapse with moderate mitral regurgitation.  Unchanged from previous evaluations.

## 2024-12-11 LAB
AORTIC VALVE MEAN GRADIENT: 4 MMHG
AORTIC VALVE PEAK VELOCITY: 1.35 M/S
AV PEAK GRADIENT: 7 MMHG
AVA (PEAK VEL): 3.49 CM2
AVA (VTI): 3.88 CM2
EJECTION FRACTION APICAL 4 CHAMBER: 59.2
EJECTION FRACTION: 63 %
LEFT ATRIUM VOLUME AREA LENGTH INDEX BSA: 27.9 ML/M2
LEFT VENTRICLE INTERNAL DIMENSION DIASTOLE: 4.87 CM (ref 3.5–6)
LEFT VENTRICULAR OUTFLOW TRACT DIAMETER: 2.35 CM
MITRAL VALVE E/A RATIO: 1.13
RIGHT VENTRICLE FREE WALL PEAK S': 1.7 CM/S
RIGHT VENTRICLE PEAK SYSTOLIC PRESSURE: 32.2 MMHG
TRICUSPID ANNULAR PLANE SYSTOLIC EXCURSION: 2.8 CM

## 2025-01-22 ENCOUNTER — APPOINTMENT (OUTPATIENT)
Dept: PRIMARY CARE | Facility: CLINIC | Age: 55
End: 2025-01-22
Payer: COMMERCIAL

## 2025-01-22 ENCOUNTER — OFFICE VISIT (OUTPATIENT)
Dept: PRIMARY CARE | Facility: CLINIC | Age: 55
End: 2025-01-22
Payer: COMMERCIAL

## 2025-01-22 VITALS
DIASTOLIC BLOOD PRESSURE: 87 MMHG | OXYGEN SATURATION: 98 % | SYSTOLIC BLOOD PRESSURE: 140 MMHG | TEMPERATURE: 97 F | BODY MASS INDEX: 27.56 KG/M2 | HEIGHT: 72 IN | WEIGHT: 203.5 LBS | HEART RATE: 90 BPM

## 2025-01-22 DIAGNOSIS — B34.9 VIRAL ILLNESS: ICD-10-CM

## 2025-01-22 DIAGNOSIS — R50.9 FEVER AND CHILLS: Primary | ICD-10-CM

## 2025-01-22 LAB
POC RAPID INFLUENZA A: NEGATIVE
POC RAPID INFLUENZA B: NEGATIVE
POC SARS-COV-2 AG BINAX: NORMAL

## 2025-01-22 PROCEDURE — 1036F TOBACCO NON-USER: CPT | Performed by: INTERNAL MEDICINE

## 2025-01-22 PROCEDURE — 3079F DIAST BP 80-89 MM HG: CPT | Performed by: INTERNAL MEDICINE

## 2025-01-22 PROCEDURE — 99213 OFFICE O/P EST LOW 20 MIN: CPT | Performed by: INTERNAL MEDICINE

## 2025-01-22 PROCEDURE — 3077F SYST BP >= 140 MM HG: CPT | Performed by: INTERNAL MEDICINE

## 2025-01-22 PROCEDURE — 87811 SARS-COV-2 COVID19 W/OPTIC: CPT | Performed by: INTERNAL MEDICINE

## 2025-01-22 PROCEDURE — 87804 INFLUENZA ASSAY W/OPTIC: CPT | Performed by: INTERNAL MEDICINE

## 2025-01-22 PROCEDURE — 3008F BODY MASS INDEX DOCD: CPT | Performed by: INTERNAL MEDICINE

## 2025-01-22 RX ORDER — PREDNISONE 20 MG/1
40 TABLET ORAL DAILY
Qty: 10 TABLET | Refills: 0 | Status: SHIPPED | OUTPATIENT
Start: 2025-01-22 | End: 2025-01-27

## 2025-01-22 ASSESSMENT — ENCOUNTER SYMPTOMS
CHILLS: 1
MYALGIAS: 1
FEVER: 1
SHORTNESS OF BREATH: 1
FATIGUE: 1
COUGH: 0
PALPITATIONS: 0
RHINORRHEA: 1

## 2025-01-22 NOTE — PROGRESS NOTES
CHIEF COMPLAINT  Feeling out of breath    HISTORY OF PRESENT ILLNESS  Dakota Nolen is a 54 y.o. male presents today for follow up of Feeling out of breath    HPI    Cold symptoms started just after [place].  Had some congestion and runny nose that persistent.  Had skin cancer removed from fast, prescribed antibiotics, which he finished on Friday. (Doxycycline)  Felt winded Saturday, sick - then nausea, diarrhea.  Achy, fever Monday night.  Felt hot and had chills.  Went into work this morning and did not feel well enough to continue.  The more he does, the more winded he feels.  Diarrhea cleared up by noon on Monday.   Had some cough Monday, but better today.    Never had any testing for flu / COVID.    REVIEW OF SYSTEMS  Review of Systems   Constitutional:  Positive for chills, fatigue and fever.   HENT:  Positive for congestion and rhinorrhea.    Respiratory:  Positive for shortness of breath. Negative for cough.    Cardiovascular:  Negative for chest pain, palpitations and leg swelling.   Gastrointestinal:         Nausea and diarrhea previously, have resolved   Musculoskeletal:  Positive for myalgias.       ALLERGIES  Patient has no known allergies.    MEDICATIONS  Current Outpatient Medications   Medication Instructions    atorvastatin (LIPITOR) 10 mg, oral, Daily    ergocalciferol, vitamin D2, (VITAMIN D2 ORAL) Take by mouth.    glucosamine sulfate (GLUCOSAMINE ORAL) Take by mouth.    losartan (COZAAR) 50 mg, oral, Daily    predniSONE (DELTASONE) 40 mg, oral, Daily       TOBACCO USE  Social History     Tobacco Use   Smoking Status Former    Types: Cigarettes   Smokeless Tobacco Never       DEPRESSION SCREEN  Over the past 2 weeks, how often have you been bothered by any of the following problems?  Little interest or pleasure in doing things: Not at all  Feeling down, depressed, or hopeless: Not at all    SURGICAL HISTORY  Past Surgical History:  03/05/2018: COLONOSCOPY      Comment:  Complete  Colonoscopy  03/08/2021: OTHER SURGICAL HISTORY      Comment:  Complete colonoscopy       OBJECTIVE    /87   Pulse 90   Temp 36.1 °C (97 °F)   Ht 1.829 m (6')   Wt 92.3 kg (203 lb 8 oz)   SpO2 98%   BMI 27.60 kg/m²    BMI: Estimated body mass index is 27.6 kg/m² as calculated from the following:    Height as of this encounter: 1.829 m (6').    Weight as of this encounter: 92.3 kg (203 lb 8 oz).    BP Readings from Last 3 Encounters:   01/22/25 140/87   12/10/24 118/81   12/10/24 116/70      Wt Readings from Last 3 Encounters:   01/22/25 92.3 kg (203 lb 8 oz)   12/10/24 90.3 kg (199 lb)   12/10/24 92.1 kg (203 lb)        PHYSICAL EXAM  Physical Exam  Constitutional:       Appearance: Normal appearance.   HENT:      Head: Normocephalic and atraumatic.   Cardiovascular:      Rate and Rhythm: Normal rate and regular rhythm.      Heart sounds: No murmur heard.  Pulmonary:      Effort: Pulmonary effort is normal. No respiratory distress.      Breath sounds: Normal breath sounds. No wheezing.   Abdominal:      General: There is no distension.      Palpations: Abdomen is soft.      Tenderness: There is no abdominal tenderness.   Neurological:      General: No focal deficit present.      Mental Status: He is alert and oriented to person, place, and time.   Psychiatric:         Mood and Affect: Mood normal.         Behavior: Behavior normal.          ASSESSMENT AND PLAN  Assessment/Plan   Problem List Items Addressed This Visit    None  Visit Diagnoses       Fever and chills    -  Primary    Relevant Orders    POCT Influenza A/B manually resulted (Completed)    POCT BinaxNOW Covid-19 Ag Card manually resulted (Completed)    Viral illness        Relevant Medications    predniSONE (Deltasone) 20 mg tablet          Viral illness - given fever, chills, nausea, and diarrhea, tested for Flu and COVID -- both negative.  The diarrhea and nausea have resolved.  Vitals look good save for elevated BP.  Congestion is not as  bad as it had been initially.  Rx sent for prednisone x 5 days to help with breathing.  Advised to avoid prolonged cold exposure (currently cold winter weather advisory).  He is able to take a few days off, so will return to work 1/27.

## 2025-02-20 ENCOUNTER — APPOINTMENT (OUTPATIENT)
Dept: PRIMARY CARE | Facility: CLINIC | Age: 55
End: 2025-02-20
Payer: COMMERCIAL

## 2025-02-20 VITALS
TEMPERATURE: 97.1 F | WEIGHT: 210.5 LBS | HEART RATE: 85 BPM | SYSTOLIC BLOOD PRESSURE: 142 MMHG | HEIGHT: 72 IN | BODY MASS INDEX: 28.51 KG/M2 | DIASTOLIC BLOOD PRESSURE: 81 MMHG | OXYGEN SATURATION: 96 %

## 2025-02-20 DIAGNOSIS — R05.8 POST-VIRAL COUGH SYNDROME: ICD-10-CM

## 2025-02-20 DIAGNOSIS — R06.2 WHEEZING: Primary | ICD-10-CM

## 2025-02-20 PROCEDURE — 3008F BODY MASS INDEX DOCD: CPT | Performed by: INTERNAL MEDICINE

## 2025-02-20 PROCEDURE — 3079F DIAST BP 80-89 MM HG: CPT | Performed by: INTERNAL MEDICINE

## 2025-02-20 PROCEDURE — 3077F SYST BP >= 140 MM HG: CPT | Performed by: INTERNAL MEDICINE

## 2025-02-20 PROCEDURE — 99213 OFFICE O/P EST LOW 20 MIN: CPT | Performed by: INTERNAL MEDICINE

## 2025-02-20 RX ORDER — ALBUTEROL SULFATE 90 UG/1
2 INHALANT RESPIRATORY (INHALATION) EVERY 6 HOURS PRN
Qty: 18 G | Refills: 0 | Status: SHIPPED | OUTPATIENT
Start: 2025-02-20

## 2025-02-20 ASSESSMENT — PATIENT HEALTH QUESTIONNAIRE - PHQ9
2. FEELING DOWN, DEPRESSED OR HOPELESS: NOT AT ALL
1. LITTLE INTEREST OR PLEASURE IN DOING THINGS: NOT AT ALL
SUM OF ALL RESPONSES TO PHQ9 QUESTIONS 1 AND 2: 0

## 2025-02-20 NOTE — PROGRESS NOTES
CHIEF COMPLAINT  Cough (For over a week/)    HISTORY OF PRESENT ILLNESS  Dakota Nolen is a 54 y.o. male presents today for follow up of Cough (For over a week/)    HPI    Was here for sick visit 1/22/25, Rx sent for prednisone.  Took some time to resolve, but then last week cough returned, with wheezing.  Was having trouble taking a full deep breath.     No fever, chills.  Some sinus drainage, but not severe.  Blows nose AM.  Cough is dry.      Today is feeling somewhat better.   No history of asthma / inhaler use in the past.    REVIEW OF SYSTEMS  Review of Systems   Constitutional:  Negative for chills and fever.   HENT:  Positive for congestion.    Respiratory:  Positive for cough, shortness of breath and wheezing.    Cardiovascular:  Negative for chest pain, palpitations and leg swelling.   Musculoskeletal:  Negative for myalgias.       ALLERGIES  Patient has no known allergies.    MEDICATIONS  Current Outpatient Medications   Medication Instructions    albuterol 90 mcg/actuation inhaler 2 puffs, inhalation, Every 6 hours PRN    atorvastatin (LIPITOR) 10 mg, oral, Daily    ergocalciferol, vitamin D2, (VITAMIN D2 ORAL) Take by mouth.    glucosamine sulfate (GLUCOSAMINE ORAL) Take by mouth.    losartan (COZAAR) 50 mg, oral, Daily       TOBACCO USE  Social History     Tobacco Use   Smoking Status Former    Types: Cigarettes   Smokeless Tobacco Never       DEPRESSION SCREEN  Over the past 2 weeks, how often have you been bothered by any of the following problems?  Little interest or pleasure in doing things: Not at all  Feeling down, depressed, or hopeless: Not at all    SURGICAL HISTORY  Past Surgical History:  03/05/2018: COLONOSCOPY      Comment:  Complete Colonoscopy  03/08/2021: OTHER SURGICAL HISTORY      Comment:  Complete colonoscopy       OBJECTIVE    /81   Pulse 85   Temp 36.2 °C (97.1 °F)   Ht 1.829 m (6')   Wt 95.5 kg (210 lb 8 oz)   SpO2 96%   BMI 28.55 kg/m²    BMI: Estimated body mass  index is 28.55 kg/m² as calculated from the following:    Height as of this encounter: 1.829 m (6').    Weight as of this encounter: 95.5 kg (210 lb 8 oz).    BP Readings from Last 3 Encounters:   02/20/25 142/81   01/22/25 140/87   12/10/24 118/81      Wt Readings from Last 3 Encounters:   02/20/25 95.5 kg (210 lb 8 oz)   01/22/25 92.3 kg (203 lb 8 oz)   12/10/24 90.3 kg (199 lb)        PHYSICAL EXAM  Physical Exam  Constitutional:       Appearance: Normal appearance.   HENT:      Head: Normocephalic and atraumatic.   Cardiovascular:      Rate and Rhythm: Normal rate and regular rhythm.      Heart sounds: No murmur heard.  Pulmonary:      Effort: Pulmonary effort is normal. No respiratory distress.      Breath sounds: Wheezing present.      Comments: Wheezing at bases  Neurological:      General: No focal deficit present.      Mental Status: He is alert and oriented to person, place, and time.   Psychiatric:         Mood and Affect: Mood normal.         Behavior: Behavior normal.          ASSESSMENT AND PLAN  Assessment/Plan   Problem List Items Addressed This Visit       Wheezing - Primary    Relevant Medications    albuterol 90 mcg/actuation inhaler     Other Visit Diagnoses       Post-viral cough syndrome              Wheezing, suspected post-viral cough, delayed recovered after influenza like illness - Received prednisone ~ 1 month ago.  Still with some wheezing.   Rx sent for albuterol PRN.  Continue supportive care.  No need for antibiotics at this time.  Follow-up as needed.

## 2025-02-21 PROBLEM — R06.2 WHEEZING: Status: ACTIVE | Noted: 2025-02-21

## 2025-02-21 ASSESSMENT — ENCOUNTER SYMPTOMS
CHILLS: 0
MYALGIAS: 0
COUGH: 1
SHORTNESS OF BREATH: 1
FEVER: 0
WHEEZING: 1
PALPITATIONS: 0

## 2025-03-15 DIAGNOSIS — R06.2 WHEEZING: ICD-10-CM

## 2025-03-15 RX ORDER — ALBUTEROL SULFATE 90 UG/1
2 INHALANT RESPIRATORY (INHALATION) EVERY 6 HOURS PRN
Qty: 18 G | Refills: 11 | Status: SHIPPED | OUTPATIENT
Start: 2025-03-15

## 2025-04-16 LAB
ALBUMIN SERPL-MCNC: 4.3 G/DL (ref 3.6–5.1)
ALBUMIN/GLOB SERPL: 1.8 (CALC) (ref 1–2.5)
ALP SERPL-CCNC: 71 U/L (ref 35–144)
ALT SERPL-CCNC: 23 U/L (ref 9–46)
AST SERPL-CCNC: 19 U/L (ref 10–35)
BILIRUB DIRECT SERPL-MCNC: 0.4 MG/DL
BILIRUB INDIRECT SERPL-MCNC: 1.8 MG/DL (CALC) (ref 0.2–1.2)
BILIRUB SERPL-MCNC: 2.2 MG/DL (ref 0.2–1.2)
GLOBULIN SER CALC-MCNC: 2.4 G/DL (CALC) (ref 1.9–3.7)
PROT SERPL-MCNC: 6.7 G/DL (ref 6.1–8.1)

## 2025-04-18 DIAGNOSIS — R17 ELEVATED BILIRUBIN: Primary | ICD-10-CM

## 2025-06-11 ENCOUNTER — APPOINTMENT (OUTPATIENT)
Dept: CARDIOLOGY | Facility: CLINIC | Age: 55
End: 2025-06-11
Payer: COMMERCIAL

## 2025-11-17 ENCOUNTER — APPOINTMENT (OUTPATIENT)
Dept: PRIMARY CARE | Facility: CLINIC | Age: 55
End: 2025-11-17
Payer: COMMERCIAL

## 2025-11-26 ENCOUNTER — APPOINTMENT (OUTPATIENT)
Dept: CARDIOLOGY | Facility: CLINIC | Age: 55
End: 2025-11-26
Payer: COMMERCIAL